# Patient Record
Sex: MALE | Race: WHITE | NOT HISPANIC OR LATINO | Employment: OTHER | URBAN - METROPOLITAN AREA
[De-identification: names, ages, dates, MRNs, and addresses within clinical notes are randomized per-mention and may not be internally consistent; named-entity substitution may affect disease eponyms.]

---

## 2023-06-07 ENCOUNTER — HOSPITAL ENCOUNTER (OUTPATIENT)
Dept: DATA CONVERSION | Facility: HOSPITAL | Age: 63
End: 2023-06-07
Attending: INTERNAL MEDICINE | Admitting: INTERNAL MEDICINE
Payer: COMMERCIAL

## 2023-06-07 DIAGNOSIS — K22.2 ESOPHAGEAL OBSTRUCTION: ICD-10-CM

## 2023-06-07 DIAGNOSIS — R12 HEARTBURN: ICD-10-CM

## 2023-06-07 DIAGNOSIS — K21.9 GASTRO-ESOPHAGEAL REFLUX DISEASE WITHOUT ESOPHAGITIS: ICD-10-CM

## 2023-06-07 DIAGNOSIS — Z12.11 ENCOUNTER FOR SCREENING FOR MALIGNANT NEOPLASM OF COLON: ICD-10-CM

## 2023-06-07 DIAGNOSIS — J45.909 UNSPECIFIED ASTHMA, UNCOMPLICATED (HHS-HCC): ICD-10-CM

## 2023-06-07 DIAGNOSIS — K29.50 UNSPECIFIED CHRONIC GASTRITIS WITHOUT BLEEDING: ICD-10-CM

## 2023-06-07 DIAGNOSIS — E11.9 TYPE 2 DIABETES MELLITUS WITHOUT COMPLICATIONS (MULTI): ICD-10-CM

## 2023-06-07 DIAGNOSIS — I10 ESSENTIAL (PRIMARY) HYPERTENSION: ICD-10-CM

## 2023-06-07 DIAGNOSIS — Z79.84 LONG TERM (CURRENT) USE OF ORAL HYPOGLYCEMIC DRUGS: ICD-10-CM

## 2023-06-07 DIAGNOSIS — R13.10 DYSPHAGIA, UNSPECIFIED: ICD-10-CM

## 2023-06-07 DIAGNOSIS — E78.5 HYPERLIPIDEMIA, UNSPECIFIED: ICD-10-CM

## 2023-06-07 LAB — POCT GLUCOSE: 155 MG/DL (ref 74–99)

## 2023-06-12 LAB
COMPLETE PATHOLOGY REPORT: NORMAL
CONVERTED CLINICAL DIAGNOSIS-HISTORY: NORMAL
CONVERTED FINAL DIAGNOSIS: NORMAL
CONVERTED FINAL REPORT PDF LINK TO COPY AND PASTE: NORMAL
CONVERTED GROSS DESCRIPTION: NORMAL

## 2023-09-07 VITALS — WEIGHT: 213.85 LBS | HEIGHT: 70 IN | BODY MASS INDEX: 30.61 KG/M2

## 2023-09-17 PROBLEM — R35.0 URINARY FREQUENCY: Status: ACTIVE | Noted: 2023-09-17

## 2023-09-17 PROBLEM — J45.909 ASTHMA (HHS-HCC): Status: ACTIVE | Noted: 2023-09-17

## 2023-09-17 PROBLEM — I10 BENIGN ESSENTIAL HYPERTENSION: Status: ACTIVE | Noted: 2023-09-17

## 2023-09-17 PROBLEM — E78.00 HYPERCHOLESTEROLEMIA: Status: ACTIVE | Noted: 2023-09-17

## 2023-09-17 PROBLEM — E66.3 OVERWEIGHT WITH BODY MASS INDEX (BMI) OF 29 TO 29.9 IN ADULT: Status: ACTIVE | Noted: 2023-09-17

## 2023-09-17 PROBLEM — K21.9 GERD (GASTROESOPHAGEAL REFLUX DISEASE): Status: ACTIVE | Noted: 2023-09-17

## 2023-09-17 PROBLEM — N40.1 BPH WITH OBSTRUCTION/LOWER URINARY TRACT SYMPTOMS: Status: ACTIVE | Noted: 2023-09-17

## 2023-09-17 PROBLEM — G47.19 EXCESSIVE DAYTIME SLEEPINESS: Status: ACTIVE | Noted: 2023-09-17

## 2023-09-17 PROBLEM — H57.10 EYE DISCOMFORT: Status: ACTIVE | Noted: 2023-09-17

## 2023-09-17 PROBLEM — D36.9 TUBULOVILLOUS ADENOMA: Status: ACTIVE | Noted: 2023-09-17

## 2023-09-17 PROBLEM — R39.9 LOWER URINARY TRACT SYMPTOMS (LUTS): Status: ACTIVE | Noted: 2023-09-17

## 2023-09-17 PROBLEM — E11.9 DM2 (DIABETES MELLITUS, TYPE 2) (MULTI): Status: ACTIVE | Noted: 2023-09-17

## 2023-09-17 PROBLEM — N13.8 BPH WITH OBSTRUCTION/LOWER URINARY TRACT SYMPTOMS: Status: ACTIVE | Noted: 2023-09-17

## 2023-09-17 PROBLEM — T15.91XA FOREIGN BODY, EYE, RIGHT, INITIAL ENCOUNTER: Status: ACTIVE | Noted: 2023-09-17

## 2023-09-17 RX ORDER — TELMISARTAN 40 MG/1
1 TABLET ORAL DAILY
COMMUNITY
Start: 2016-12-06 | End: 2023-10-30

## 2023-09-17 RX ORDER — TOBRAMYCIN 3 MG/ML
2 SOLUTION/ DROPS OPHTHALMIC 3 TIMES DAILY
COMMUNITY
Start: 2019-11-03

## 2023-09-17 RX ORDER — MULTIVITAMIN
1 TABLET ORAL DAILY
COMMUNITY
Start: 2014-12-10

## 2023-09-17 RX ORDER — METFORMIN HYDROCHLORIDE 1000 MG/1
1 TABLET ORAL
COMMUNITY
Start: 2021-09-30 | End: 2023-10-30

## 2023-09-17 RX ORDER — METHOTREXATE 2.5 MG/1
6 TABLET ORAL
COMMUNITY
Start: 2012-10-03 | End: 2024-01-03 | Stop reason: SDUPTHER

## 2023-09-17 RX ORDER — FOLIC ACID 1 MG/1
1 TABLET ORAL DAILY
COMMUNITY
End: 2024-01-03 | Stop reason: SDUPTHER

## 2023-09-17 RX ORDER — ROSUVASTATIN CALCIUM 10 MG/1
1 TABLET, COATED ORAL DAILY
COMMUNITY
Start: 2014-04-16 | End: 2023-11-27

## 2023-09-17 RX ORDER — KETOCONAZOLE 20 MG/ML
SHAMPOO, SUSPENSION TOPICAL
COMMUNITY
Start: 2023-04-17

## 2023-09-17 RX ORDER — MINERAL OIL
1 ENEMA (ML) RECTAL DAILY PRN
COMMUNITY
Start: 2018-04-02

## 2023-09-17 RX ORDER — PANTOPRAZOLE SODIUM 40 MG/1
1 TABLET, DELAYED RELEASE ORAL DAILY
COMMUNITY
End: 2024-01-24 | Stop reason: ALTCHOICE

## 2023-09-17 RX ORDER — BLOOD SUGAR DIAGNOSTIC
STRIP MISCELLANEOUS
COMMUNITY
Start: 2021-11-30 | End: 2024-04-03 | Stop reason: ALTCHOICE

## 2023-09-17 RX ORDER — AMLODIPINE BESYLATE 5 MG/1
1 TABLET ORAL EVERY EVENING
COMMUNITY
Start: 2023-05-22 | End: 2024-04-15

## 2023-09-17 RX ORDER — LANCETS
EACH MISCELLANEOUS DAILY
COMMUNITY

## 2023-10-02 ASSESSMENT — ENCOUNTER SYMPTOMS
POLYPHAGIA: 0
HUNGER: 0
BLACKOUTS: 0
WEAKNESS: 0
POLYDIPSIA: 0
SPEECH DIFFICULTY: 0
WEIGHT LOSS: 0
CONFUSION: 0
SEIZURES: 0
HEADACHES: 0
FATIGUE: 0
TREMORS: 0
BLURRED VISION: 0
VISUAL CHANGE: 0
SWEATS: 0
DIZZINESS: 0
NERVOUS/ANXIOUS: 0

## 2023-10-03 ENCOUNTER — OFFICE VISIT (OUTPATIENT)
Dept: PRIMARY CARE | Facility: CLINIC | Age: 63
End: 2023-10-03
Payer: COMMERCIAL

## 2023-10-03 VITALS
HEIGHT: 70 IN | SYSTOLIC BLOOD PRESSURE: 130 MMHG | TEMPERATURE: 97.3 F | OXYGEN SATURATION: 98 % | WEIGHT: 208.9 LBS | DIASTOLIC BLOOD PRESSURE: 85 MMHG | HEART RATE: 73 BPM | BODY MASS INDEX: 29.91 KG/M2

## 2023-10-03 DIAGNOSIS — I10 BENIGN ESSENTIAL HYPERTENSION: Primary | ICD-10-CM

## 2023-10-03 DIAGNOSIS — E11.9 TYPE 2 DIABETES MELLITUS WITHOUT COMPLICATION, WITHOUT LONG-TERM CURRENT USE OF INSULIN (MULTI): ICD-10-CM

## 2023-10-03 DIAGNOSIS — E78.00 HYPERCHOLESTEROLEMIA: ICD-10-CM

## 2023-10-03 PROBLEM — T15.91XA FOREIGN BODY, EYE, RIGHT, INITIAL ENCOUNTER: Status: RESOLVED | Noted: 2023-09-17 | Resolved: 2023-10-03

## 2023-10-03 LAB — POC HEMOGLOBIN A1C: 7.6 % (ref 4.2–6.5)

## 2023-10-03 PROCEDURE — 1036F TOBACCO NON-USER: CPT | Performed by: INTERNAL MEDICINE

## 2023-10-03 PROCEDURE — 83036 HEMOGLOBIN GLYCOSYLATED A1C: CPT | Performed by: INTERNAL MEDICINE

## 2023-10-03 PROCEDURE — 99214 OFFICE O/P EST MOD 30 MIN: CPT | Performed by: INTERNAL MEDICINE

## 2023-10-03 PROCEDURE — 3079F DIAST BP 80-89 MM HG: CPT | Performed by: INTERNAL MEDICINE

## 2023-10-03 PROCEDURE — 90471 IMMUNIZATION ADMIN: CPT | Performed by: INTERNAL MEDICINE

## 2023-10-03 PROCEDURE — 4010F ACE/ARB THERAPY RXD/TAKEN: CPT | Performed by: INTERNAL MEDICINE

## 2023-10-03 PROCEDURE — 3075F SYST BP GE 130 - 139MM HG: CPT | Performed by: INTERNAL MEDICINE

## 2023-10-03 PROCEDURE — 90686 IIV4 VACC NO PRSV 0.5 ML IM: CPT | Performed by: INTERNAL MEDICINE

## 2023-10-03 ASSESSMENT — ENCOUNTER SYMPTOMS
FEVER: 0
FATIGUE: 0
TREMORS: 0
COUGH: 0
WEAKNESS: 0
SEIZURES: 0
SPEECH DIFFICULTY: 0
BLACKOUTS: 0
VISUAL CHANGE: 0
SWEATS: 0
HUNGER: 0
ABDOMINAL PAIN: 0
SHORTNESS OF BREATH: 0
WEIGHT LOSS: 0
MYALGIAS: 1
BLURRED VISION: 0
HEADACHES: 0
POLYDIPSIA: 0
POLYPHAGIA: 0
CONFUSION: 0
NERVOUS/ANXIOUS: 0
DIZZINESS: 0

## 2023-10-03 ASSESSMENT — PATIENT HEALTH QUESTIONNAIRE - PHQ9
SUM OF ALL RESPONSES TO PHQ9 QUESTIONS 1 AND 2: 0
2. FEELING DOWN, DEPRESSED OR HOPELESS: NOT AT ALL
1. LITTLE INTEREST OR PLEASURE IN DOING THINGS: NOT AT ALL

## 2023-10-03 ASSESSMENT — PAIN SCALES - GENERAL: PAINLEVEL: 0-NO PAIN

## 2023-10-03 NOTE — PROGRESS NOTES
Subjective   Patient ID: Sanjiv Jhaveri is a 63 y.o. male who presents for Follow-up (Diabetes follow up ).    Diabetes  He has type 2 diabetes mellitus. No MedicAlert identification noted. The initial diagnosis of diabetes was made 4 years ago. Pertinent negatives for hypoglycemia include no confusion, dizziness, headaches, hunger, mood changes, nervousness/anxiousness, pallor, seizures, sleepiness, speech difficulty, sweats or tremors. Pertinent negatives for diabetes include no blurred vision, no chest pain, no fatigue, no foot paresthesias, no foot ulcerations, no polydipsia, no polyphagia, no polyuria, no visual change, no weakness and no weight loss. Pertinent negatives for hypoglycemia complications include no blackouts, no hospitalization, no nocturnal hypoglycemia, no required assistance and no required glucagon injection. Symptoms are stable. Pertinent negatives for diabetic complications include no CVA, heart disease, impotence, nephropathy, peripheral neuropathy, PVD or retinopathy. Risk factors for coronary artery disease include dyslipidemia, family history, hypertension and obesity. Current diabetic treatment includes diet and oral agent (monotherapy). He is compliant with treatment all of the time. His weight is stable. He is following a generally healthy diet. Meal planning includes carbohydrate counting. He has not had a previous visit with a dietitian. He participates in exercise daily. He monitors blood glucose at home 1-2 x per week. Blood glucose monitoring compliance is excellent. He does not see a podiatrist.Eye exam is current.       Since last visit, was sick around [facility].  Had GI virus, then developed trouble swallowing.  EGD showed Schatzi's ring and severe irritation in esophagus.    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  Sometimes gets some cramping in lower legs and feet at night.  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    Tried a carnivore diet for about  "10 days - states his blood sugar dropped to 80s.    Has not been intermittent fasting as much due to his job - the activity triggers more hunger.      Review of Systems   Constitutional:  Negative for fatigue, fever and weight loss.   Eyes:  Negative for blurred vision.   Respiratory:  Negative for cough and shortness of breath.    Cardiovascular:  Negative for chest pain.   Gastrointestinal:  Negative for abdominal pain.   Endocrine: Negative for polydipsia, polyphagia and polyuria.   Genitourinary:  Negative for impotence.   Musculoskeletal:  Positive for myalgias.   Skin:  Negative for pallor and rash.   Neurological:  Negative for dizziness, tremors, seizures, speech difficulty, weakness and headaches.   Psychiatric/Behavioral:  Negative for confusion. The patient is not nervous/anxious.        Objective   /85   Pulse 73   Temp 36.3 °C (97.3 °F) (Temporal)   Ht 1.778 m (5' 10\")   Wt 94.8 kg (208 lb 14.4 oz)   SpO2 98%   BMI 29.97 kg/m²     Physical Exam  Constitutional:       Appearance: Normal appearance.   Cardiovascular:      Rate and Rhythm: Normal rate and regular rhythm.   Pulmonary:      Effort: Pulmonary effort is normal. No respiratory distress.      Breath sounds: Normal breath sounds.   Abdominal:      General: Abdomen is flat. There is no distension.      Palpations: Abdomen is soft.   Musculoskeletal:         General: No swelling.   Skin:     Findings: No rash.   Neurological:      General: No focal deficit present.      Mental Status: He is alert and oriented to person, place, and time. Mental status is at baseline.   Psychiatric:         Mood and Affect: Mood normal.         Behavior: Behavior normal.         Thought Content: Thought content normal.         Judgment: Judgment normal.         Assessment/Plan   Problem List Items Addressed This Visit             ICD-10-CM    Benign essential hypertension - Primary I10    DM2 (diabetes mellitus, type 2) (CMS/[facility]) E11.9    Relevant Orders "    POCT glycosylated hemoglobin (Hb A1C) manually resulted    Hypercholesterolemia E78.00     DM2 - well controlled per home monitoring.  OK to follow lower carb diet, but should try to reduce saturated fat.  Continue with regular physical activity.  Check A1c.  Continue current medication.  He's up to date with regular eye exams.    Hypertension - at goal, continue current medication.    Hypercholesterolemia - controlled, values from March 2023 looked good.    Leg cramps / foot cramps - stay well hydrated, wear support shoes, can massage legs and feet in the evening and/or use a muscle roller to loosen up tissues.  For acute cramps (ie. [place]), can try yellow mustard or pickle juice.    Flu shot today.    Follow-up in 6 months and PRN.

## 2023-10-19 ENCOUNTER — OFFICE VISIT (OUTPATIENT)
Dept: UROLOGY | Facility: HOSPITAL | Age: 63
End: 2023-10-19
Payer: COMMERCIAL

## 2023-10-19 DIAGNOSIS — R39.9 LOWER URINARY TRACT SYMPTOMS (LUTS): Primary | ICD-10-CM

## 2023-10-19 PROCEDURE — 1036F TOBACCO NON-USER: CPT | Performed by: UROLOGY

## 2023-10-19 PROCEDURE — 99213 OFFICE O/P EST LOW 20 MIN: CPT | Performed by: UROLOGY

## 2023-10-19 PROCEDURE — 4010F ACE/ARB THERAPY RXD/TAKEN: CPT | Performed by: UROLOGY

## 2023-10-19 ASSESSMENT — PAIN SCALES - GENERAL: PAINLEVEL: 0-NO PAIN

## 2023-10-19 NOTE — PROGRESS NOTES
FUV    Last seen - 10/11/23     HISTORY OF PRESENT ILLNESS:   Sanjiv Jhaveri is a 63 y.o. male who is being seen today for FUV.  Pt with h/o BPH/LUTS s/p Rezum on 12/8/21 - 7 treatments.  Has been doing well.  Urinary symptoms much improved, rare urgency.  No dysuria or hematuria.  Reports his PSA was checked by PCP, told it was in normal range.      PAST MEDICAL HISTORY:  Past Medical History:   Diagnosis Date    Allergic contact dermatitis due to plants, except food 06/19/2017    Poison ivy dermatitis    Encounter for immunization 11/14/2016    Need for prophylactic vaccination and inoculation against influenza    Encounter for other preprocedural examination 11/30/2021    Pre-op exam    Other specified postprocedural states     History of left heart catheterization    Personal history of colonic polyps     History of colonic polyps    Personal history of other diseases of the respiratory system 11/03/2015    History of sinusitis    Personal history of other malignant neoplasm of skin     History of malignant neoplasm of skin    Radial styloid tenosynovitis (de quervain) 12/14/2016    De Quervain's tenosynovitis, left       PAST SURGICAL HISTORY:  Past Surgical History:   Procedure Laterality Date    COLONOSCOPY  11/03/2015    Complete Colonoscopy    OTHER SURGICAL HISTORY  06/10/2020    Complete colonoscopy    OTHER SURGICAL HISTORY  11/24/2015    Biopsy Skin    OTHER SURGICAL HISTORY  11/24/2015    Lipectomy    OTHER SURGICAL HISTORY  04/17/2017    Treatment Of The Left Wrist    SINUS SURGERY  11/24/2015    Sinus Surgery        ALLERGIES:   No Known Allergies     MEDICATIONS:   Current Outpatient Medications   Medication Instructions    amLODIPine (Norvasc) 5 mg tablet 1 tablet, oral, Every evening    fexofenadine (Allegra) 180 mg tablet 1 tablet, oral, Daily PRN    folic acid (FOLVITE) 1 mg, oral, Daily    ketoconazole (NIZOral) 2 % shampoo wash scalp and leave on for 15 MINUTES then wash off EVERY DAY to  THREE TIMES WEEKLY    lancets (OneTouch UltraSoft Lancets) misc Daily, CHECK FASTING BLOOD GLUCOSE    metFORMIN (Glucophage) 1,000 mg tablet 1 tablet, oral, 2 times daily with meals    methotrexate (Trexall) 2.5 mg tablet 6 tablets, oral, Weekly, WITH FOOD    multivitamin (multivitamin with folic acid) tablet 1 tablet, oral, Daily    OneTouch Ultra Test strip  Check fasting blood sugar once daily. Record in log book.    pantoprazole (ProtoNix) 40 mg EC tablet 1 tablet, oral, Daily, Do not crush, chew, or split.    rosuvastatin (Crestor) 10 mg tablet 1 tablet, oral, Daily    telmisartan (MIcarDIS) 40 mg tablet 1 tablet, oral, Daily    tobramycin (Tobrex) 0.3 % ophthalmic solution 2 drops, ophthalmic (eye), 3 times daily, to the affected eye        PHYSICAL EXAM:  There were no vitals taken for this visit.  Constitutional: Patient appears well-developed and well-nourished. No distress.    Pulmonary/Chest: Effort normal. No respiratory distress.   Musculoskeletal: Normal range of motion.    Neurological: Alert and oriented to person, place, and time.  Psychiatric: Normal mood and affect. Behavior is normal. Thought content normal.      Labs  Lab Results   Component Value Date    CHOL 144 02/23/2022     Lab Results   Component Value Date    HDL 54.7 02/23/2022     Lab Results   Component Value Date    CHHDL 2.6 02/23/2022     Lab Results   Component Value Date    LDLF 72 02/23/2022     Lab Results   Component Value Date    VLDL 17 02/23/2022     Lab Results   Component Value Date    TRIG 86 02/23/2022     Lab Results   Component Value Date    HGBA1C 7.6 (A) 10/03/2023           Assessment:      1. Lower urinary tract symptoms (LUTS)            Sanjiv Jhaveri is a 63 y.o. male here for FUV. Doing well.  No concerns     Plan:   1)  Continue fu with PCP, come back and see me with any issues or concerns

## 2023-10-30 DIAGNOSIS — I10 BENIGN ESSENTIAL HYPERTENSION: Primary | ICD-10-CM

## 2023-10-30 DIAGNOSIS — E11.9 TYPE 2 DIABETES MELLITUS WITHOUT COMPLICATION, WITHOUT LONG-TERM CURRENT USE OF INSULIN (MULTI): ICD-10-CM

## 2023-10-30 RX ORDER — METFORMIN HYDROCHLORIDE 1000 MG/1
1000 TABLET ORAL
Qty: 180 TABLET | Refills: 3 | Status: SHIPPED | OUTPATIENT
Start: 2023-10-30

## 2023-10-30 RX ORDER — TELMISARTAN 40 MG/1
40 TABLET ORAL DAILY
Qty: 90 TABLET | Refills: 3 | Status: SHIPPED | OUTPATIENT
Start: 2023-10-30

## 2023-11-27 DIAGNOSIS — E78.00 HYPERCHOLESTEROLEMIA: Primary | ICD-10-CM

## 2023-11-27 RX ORDER — ROSUVASTATIN CALCIUM 10 MG/1
10 TABLET, COATED ORAL DAILY
Qty: 90 TABLET | Refills: 3 | Status: SHIPPED | OUTPATIENT
Start: 2023-11-27

## 2024-01-03 DIAGNOSIS — M35.9 UNDIFFERENTIATED CONNECTIVE TISSUE DISEASE (MULTI): ICD-10-CM

## 2024-01-03 RX ORDER — FOLIC ACID 1 MG/1
1 TABLET ORAL DAILY
Qty: 30 TABLET | Refills: 8 | Status: SHIPPED | OUTPATIENT
Start: 2024-01-03

## 2024-01-03 RX ORDER — METHOTREXATE 2.5 MG/1
15 TABLET ORAL
Qty: 24 TABLET | Refills: 4 | Status: SHIPPED | OUTPATIENT
Start: 2024-01-03 | End: 2024-04-03 | Stop reason: ALTCHOICE

## 2024-01-19 ENCOUNTER — TELEPHONE (OUTPATIENT)
Dept: PRIMARY CARE | Facility: CLINIC | Age: 64
End: 2024-01-19
Payer: COMMERCIAL

## 2024-01-19 DIAGNOSIS — E11.9 TYPE 2 DIABETES MELLITUS WITHOUT COMPLICATION, WITHOUT LONG-TERM CURRENT USE OF INSULIN (MULTI): Primary | ICD-10-CM

## 2024-01-19 RX ORDER — BLOOD-GLUCOSE METER
1 EACH MISCELLANEOUS DAILY
Qty: 100 STRIP | Refills: 3 | Status: SHIPPED | OUTPATIENT
Start: 2024-01-19

## 2024-01-19 RX ORDER — LANCETS 33 GAUGE
EACH MISCELLANEOUS
Qty: 100 EACH | Refills: 3 | Status: SHIPPED | OUTPATIENT
Start: 2024-01-19

## 2024-01-19 NOTE — TELEPHONE ENCOUNTER
Rx line 1050    Lancets and test strips  1 touch vario flex -delicate plus    Pt stated he has 1 wk left.    [facility]    [phone]

## 2024-01-23 LAB
NON-UH HIE A/G RATIO: 1.6
NON-UH HIE ALB: 4.2 G/DL (ref 3.4–5)
NON-UH HIE ALK PHOS: 96 UNIT/L (ref 45–117)
NON-UH HIE BASO COUNT: 0.02 X1000 (ref 0–0.2)
NON-UH HIE BASOS %: 0.4 %
NON-UH HIE BILIRUBIN, TOTAL: 2.4 MG/DL (ref 0.3–1.2)
NON-UH HIE BUN/CREAT RATIO: 15
NON-UH HIE BUN: 15 MG/DL (ref 9–23)
NON-UH HIE C-REACTIVE PROTEIN, QUANTITATIVE: <0.4 MG/DL (ref 0–0.9)
NON-UH HIE CALCIUM: 9.7 MG/DL (ref 8.7–10.4)
NON-UH HIE CALCULATED OSMOLALITY: 283 MOSM/KG (ref 275–295)
NON-UH HIE CHLORIDE: 105 MMOL/L (ref 98–107)
NON-UH HIE CO2, VENOUS: 26 MMOL/L (ref 20–31)
NON-UH HIE CREATININE: 1 MG/DL (ref 0.6–1.1)
NON-UH HIE DIFF?: NO
NON-UH HIE EOS COUNT: 0.14 X1000 (ref 0–0.5)
NON-UH HIE EOSIN %: 2.6 %
NON-UH HIE GFR AA: >60
NON-UH HIE GLOBULIN: 2.7 G/DL
NON-UH HIE GLOMERULAR FILTRATION RATE: >60 ML/MIN/1.73M?
NON-UH HIE GLUCOSE: 142 MG/DL (ref 74–106)
NON-UH HIE GOT: 26 UNIT/L (ref 15–37)
NON-UH HIE GPT: 26 UNIT/L (ref 10–49)
NON-UH HIE HCT: 42.4 % (ref 41–52)
NON-UH HIE HGB: 14.1 G/DL (ref 13.5–17.5)
NON-UH HIE INSTR WBC: 5.6
NON-UH HIE K: 4.7 MMOL/L (ref 3.5–5.1)
NON-UH HIE LYMPH %: 33.9 %
NON-UH HIE LYMPH COUNT: 1.9 X1000 (ref 1.2–4.8)
NON-UH HIE MCH: 29.6 PG (ref 27–34)
NON-UH HIE MCHC: 33.3 G/DL (ref 32–37)
NON-UH HIE MCV: 89 FL (ref 80–100)
NON-UH HIE MONO %: 9.4 %
NON-UH HIE MONO COUNT: 0.53 X1000 (ref 0.1–1)
NON-UH HIE MPV: 8.4 FL (ref 7.4–10.4)
NON-UH HIE NA: 140 MMOL/L (ref 135–145)
NON-UH HIE NEUTROPHIL %: 53.7 %
NON-UH HIE NEUTROPHIL COUNT (ANC): 3.01 X1000 (ref 1.4–8.8)
NON-UH HIE NUCLEATED RBC: 0 /100WBC
NON-UH HIE PLATELET: 222 X10 (ref 150–450)
NON-UH HIE RBC: 4.77 X10 (ref 4.7–6.1)
NON-UH HIE RDW: 14.1 % (ref 11.5–14.5)
NON-UH HIE TOTAL PROTEIN: 6.9 G/DL (ref 5.7–8.2)
NON-UH HIE WBC: 5.6 X10 (ref 4.5–11)

## 2024-01-24 ENCOUNTER — OFFICE VISIT (OUTPATIENT)
Dept: RHEUMATOLOGY | Facility: CLINIC | Age: 64
End: 2024-01-24
Payer: COMMERCIAL

## 2024-01-24 VITALS
BODY MASS INDEX: 29.49 KG/M2 | TEMPERATURE: 97.3 F | DIASTOLIC BLOOD PRESSURE: 98 MMHG | SYSTOLIC BLOOD PRESSURE: 147 MMHG | HEART RATE: 106 BPM | WEIGHT: 206 LBS | HEIGHT: 70 IN

## 2024-01-24 DIAGNOSIS — E80.6 HYPERBILIRUBINEMIA: Primary | ICD-10-CM

## 2024-01-24 LAB
Lab: 0.8 MG/DL (ref 0.2–0.8)
NON-UH HIE BILIRUBIN, DIRECT: 0.43 MG/DL (ref 0–0.4)
NON-UH HIE BILIRUBIN, TOTAL: 1.2 MG/DL (ref 0.3–1.2)

## 2024-01-24 PROCEDURE — 99214 OFFICE O/P EST MOD 30 MIN: CPT | Performed by: INTERNAL MEDICINE

## 2024-01-24 PROCEDURE — 3077F SYST BP >= 140 MM HG: CPT | Performed by: INTERNAL MEDICINE

## 2024-01-24 PROCEDURE — 4010F ACE/ARB THERAPY RXD/TAKEN: CPT | Performed by: INTERNAL MEDICINE

## 2024-01-24 PROCEDURE — 3080F DIAST BP >= 90 MM HG: CPT | Performed by: INTERNAL MEDICINE

## 2024-01-24 PROCEDURE — 1036F TOBACCO NON-USER: CPT | Performed by: INTERNAL MEDICINE

## 2024-01-24 ASSESSMENT — PAIN SCALES - GENERAL: PAINLEVEL: 0-NO PAIN

## 2024-01-24 NOTE — PROGRESS NOTES
He is currently doing exercises for bilateral frozen shoulder.  The right frozen shoulder has significantly improved over the past 1 year.  He recently developed left frozen shoulder after immobilization following surgery for trigger finger release on the second and fifth digits of the left hand.  He has not noted any nausea, abdominal pain, or any significant joint swelling.  He underwent EGD with dilation of lower esophageal Schatzki's rings.    Examination shows the lungs, heart, abdomen, and extremities to be benign the musculoskeletal examination shows rotation to the abduction of shoulders (90 degrees with pain abduction of the left shoulder.  There is Dupuytren contracture left hand more so than the right.  There is no jaundice.    Laboratory (1/23/2024) glucose 142, BUN 15, creatinine 1.0, total bilirubin 2.40, alkaline phosphatase 96, SGOT 26, GPT 26, WBC 5.6, hemoglobin 14.1, hematocrit 42.4, MCV 89, MCHC 33.3, platelets 222, CRP <0.4.    Has history of idiopathic iritis that has been is asymptomatic.  He has osteoarthritis, type 2 diabetes mellitus, hypertension, BPH, asthma, distal esophageal strictures, elevated total bilirubin.    He is continue methotrexate 15 ultrasound of the left total and direct bilirubin consider gastroenterology evaluation.  He is to return at the next available office evaluation.

## 2024-01-26 LAB — NON-UH HIE ANTI-MITOCHONDRIAL ANTIBODIES: 1.8 (ref 0–24.9)

## 2024-01-27 LAB — NON-UH HIE SMOOTH MUSCLE AB, IGG TITER: NORMAL

## 2024-01-30 ENCOUNTER — TELEPHONE (OUTPATIENT)
Dept: RHEUMATOLOGY | Facility: CLINIC | Age: 64
End: 2024-01-30

## 2024-02-05 DIAGNOSIS — K76.0 FATTY LIVER: Primary | ICD-10-CM

## 2024-02-05 LAB
NON-UH HIE HEPATITIS B SURFACE ANTIBODY: REACTIVE
NON-UH HIE HEPATITIS B SURFACE ANTIGEN: NONREACTIVE
NON-UH HIE HEPATITIS C ANTIBODY: NONREACTIVE

## 2024-02-06 DIAGNOSIS — H20.9 UVEITIS: Primary | ICD-10-CM

## 2024-02-06 LAB
NON-UH HIE ANA PATTERN: NORMAL
NON-UH HIE ANTI-NUCLEAR AB QUANT: NORMAL
NON-UH HIE ANTI-NUCLEAR ANTIBODY: POSITIVE

## 2024-02-06 RX ORDER — ADALIMUMAB 40MG/0.4ML
40 KIT SUBCUTANEOUS
Qty: 2 EACH | Refills: 5 | Status: SHIPPED | OUTPATIENT
Start: 2024-02-06 | End: 2024-02-13 | Stop reason: SDUPTHER

## 2024-02-06 NOTE — PROGRESS NOTES
Per Dr. 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  He is currently taking methotrexate with liver ultrasound demonstrating fatty liver and positive anti-smooth muscle antibody.

## 2024-02-07 ENCOUNTER — SPECIALTY PHARMACY (OUTPATIENT)
Dept: PHARMACY | Facility: CLINIC | Age: 64
End: 2024-02-07

## 2024-02-07 LAB — NON-UH HIE HEPATITIS B CORE ANTIBODIES, TOTAL: NEGATIVE

## 2024-02-08 LAB
NON-UH HIE QUANTIFERON MITOGEN MINUS NIL: 9 IU/ML
NON-UH HIE QUANTIFERON NIL: 0.14 IU/ML
NON-UH HIE QUANTIFERON PLUS TB1 MINUS NIL: 0.01 IU/ML (ref 0–0.34)
NON-UH HIE QUANTIFERON PLUS TB2 MINUS NIL: 0 IU/ML (ref 0–0.34)
NON-UH HIE QUANTIFERON TB GOLD PLUS: NEGATIVE

## 2024-02-09 LAB
NON-UH HIE SMITH (ENA) ANTIBODY, IGG: 1 (ref 0–40)
NON-UH HIE SMITH/RNP (ENA) AB, IGG: 2 (ref 0–19)
NON-UH HIE SSA 52 (RO) (ENA) AB, IGG: 5 (ref 0–40)
NON-UH HIE SSA 60 (RO) (ENA) AB, IGG: 1 (ref 0–40)
NON-UH HIE SSB (LA) (ENA) AB, IGG: 0 (ref 0–40)

## 2024-02-11 ENCOUNTER — TELEPHONE (OUTPATIENT)
Dept: PHARMACY | Facility: CLINIC | Age: 64
End: 2024-02-11

## 2024-02-13 DIAGNOSIS — H20.9 UVEITIS: ICD-10-CM

## 2024-02-13 RX ORDER — ADALIMUMAB 40MG/0.4ML
40 KIT SUBCUTANEOUS
Qty: 2 EACH | Refills: 5 | Status: SHIPPED | OUTPATIENT
Start: 2024-02-13 | End: 2024-03-20 | Stop reason: CLARIF

## 2024-02-22 ENCOUNTER — APPOINTMENT (OUTPATIENT)
Dept: UROLOGY | Facility: HOSPITAL | Age: 64
End: 2024-02-22
Payer: COMMERCIAL

## 2024-03-05 ENCOUNTER — TELEPHONE (OUTPATIENT)
Dept: RHEUMATOLOGY | Facility: CLINIC | Age: 64
End: 2024-03-05
Payer: COMMERCIAL

## 2024-03-05 NOTE — TELEPHONE ENCOUNTER
Pt call returned regarding Humira being lost pharmacies. Unable to establish direct contact with pt and vm left. Pt made aware that script sent to [facility] and will be coming from there. Phone number left for pharmacy on vm as reference if needed. Encouraged to call/ message office should anything further be needed.

## 2024-03-05 NOTE — TELEPHONE ENCOUNTER
Patient's Humira got lost in getting refilled between [facility] and [facility]. , Patient needs a refill, please advise.

## 2024-03-12 ENCOUNTER — TELEPHONE (OUTPATIENT)
Dept: RHEUMATOLOGY | Facility: CLINIC | Age: 64
End: 2024-03-12
Payer: COMMERCIAL

## 2024-03-12 DIAGNOSIS — H20.00: Primary | ICD-10-CM

## 2024-03-12 NOTE — TELEPHONE ENCOUNTER
Call placed to pt pharmacy [facility] to clarify request for verbal order when one was previously sent with refills in February with refills. Per rep, Ilda, pt does not have an account created. Ilda states that pt is using another pharmacy. This nurse to consult with pharmacy. Nothing further needed.

## 2024-03-12 NOTE — TELEPHONE ENCOUNTER
[facility] on Humira. Please call [phone].

## 2024-03-20 RX ORDER — ADALIMUMAB-ADAZ 40 MG/.4ML
40 INJECTION, SOLUTION SUBCUTANEOUS
Qty: 0.8 ML | Refills: 5 | Status: SHIPPED | OUTPATIENT
Start: 2024-03-20

## 2024-03-20 NOTE — TELEPHONE ENCOUNTER
Per insurance, pt must switch from Humira to Hyrimoz. PA approved through Aug 2024. Pt must fill with [facility]. Updated Hyrimoz rx rerouted to [facility].

## 2024-03-27 ASSESSMENT — ENCOUNTER SYMPTOMS
VISUAL CHANGE: 0
TREMORS: 0
SWEATS: 0
SPEECH DIFFICULTY: 0
WEIGHT LOSS: 0
WEAKNESS: 0
POLYDIPSIA: 0
HEADACHES: 0
POLYPHAGIA: 0
SEIZURES: 0
BLACKOUTS: 0
CONFUSION: 0
BLURRED VISION: 0
FATIGUE: 1
DIZZINESS: 0
NERVOUS/ANXIOUS: 0
HUNGER: 0

## 2024-04-03 ENCOUNTER — OFFICE VISIT (OUTPATIENT)
Dept: PRIMARY CARE | Facility: CLINIC | Age: 64
End: 2024-04-03
Payer: COMMERCIAL

## 2024-04-03 VITALS
TEMPERATURE: 97.9 F | OXYGEN SATURATION: 98 % | WEIGHT: 202 LBS | HEIGHT: 70 IN | DIASTOLIC BLOOD PRESSURE: 91 MMHG | SYSTOLIC BLOOD PRESSURE: 147 MMHG | HEART RATE: 81 BPM | BODY MASS INDEX: 28.92 KG/M2

## 2024-04-03 DIAGNOSIS — E11.9 TYPE 2 DIABETES MELLITUS WITHOUT COMPLICATION, WITHOUT LONG-TERM CURRENT USE OF INSULIN (MULTI): Primary | ICD-10-CM

## 2024-04-03 DIAGNOSIS — E11.65 TYPE 2 DIABETES MELLITUS WITH HYPERGLYCEMIA, WITHOUT LONG-TERM CURRENT USE OF INSULIN (MULTI): ICD-10-CM

## 2024-04-03 DIAGNOSIS — I10 BENIGN ESSENTIAL HYPERTENSION: ICD-10-CM

## 2024-04-03 LAB — POC HEMOGLOBIN A1C: 7.5 % (ref 4.2–6.5)

## 2024-04-03 PROCEDURE — 99213 OFFICE O/P EST LOW 20 MIN: CPT | Performed by: INTERNAL MEDICINE

## 2024-04-03 PROCEDURE — 3080F DIAST BP >= 90 MM HG: CPT | Performed by: INTERNAL MEDICINE

## 2024-04-03 PROCEDURE — 83036 HEMOGLOBIN GLYCOSYLATED A1C: CPT | Performed by: INTERNAL MEDICINE

## 2024-04-03 PROCEDURE — 1036F TOBACCO NON-USER: CPT | Performed by: INTERNAL MEDICINE

## 2024-04-03 PROCEDURE — 3077F SYST BP >= 140 MM HG: CPT | Performed by: INTERNAL MEDICINE

## 2024-04-03 PROCEDURE — 4010F ACE/ARB THERAPY RXD/TAKEN: CPT | Performed by: INTERNAL MEDICINE

## 2024-04-03 ASSESSMENT — PAIN SCALES - GENERAL: PAINLEVEL: 5

## 2024-04-03 ASSESSMENT — ENCOUNTER SYMPTOMS
BLURRED VISION: 0
HUNGER: 0
VISUAL CHANGE: 0
BLACKOUTS: 0
SEIZURES: 0
HEADACHES: 0
DIZZINESS: 0
POLYDIPSIA: 0
DEPRESSION: 0
LOSS OF SENSATION IN FEET: 0
WEIGHT LOSS: 0
FATIGUE: 1
OCCASIONAL FEELINGS OF UNSTEADINESS: 0
WEAKNESS: 0
CONFUSION: 0
NERVOUS/ANXIOUS: 0
TREMORS: 0
POLYPHAGIA: 0
SPEECH DIFFICULTY: 0
SWEATS: 0

## 2024-04-03 ASSESSMENT — PATIENT HEALTH QUESTIONNAIRE - PHQ9
1. LITTLE INTEREST OR PLEASURE IN DOING THINGS: NOT AT ALL
SUM OF ALL RESPONSES TO PHQ9 QUESTIONS 1 AND 2: 0
2. FEELING DOWN, DEPRESSED OR HOPELESS: NOT AT ALL

## 2024-04-03 NOTE — PROGRESS NOTES
CHIEF COMPLAINT  Follow-up (Left shoulder pain, H1ac, 6 month follow up )    HISTORY OF PRESENT ILLNESS  Sanjiv Jhaveri is a 63 y.o. male presents today for follow up of Follow-up (Left shoulder pain, H1ac, 6 month follow up )    Diabetes  He has type 2 diabetes mellitus. No MedicAlert identification noted. The initial diagnosis of diabetes was made 6 years ago. Pertinent negatives for hypoglycemia include no confusion, dizziness, headaches, hunger, mood changes, nervousness/anxiousness, pallor, seizures, sleepiness, speech difficulty, sweats or tremors. Associated symptoms include fatigue. Pertinent negatives for diabetes include no blurred vision, no chest pain, no foot paresthesias, no foot ulcerations, no polydipsia, no polyphagia, no polyuria, no visual change, no weakness and no weight loss. Pertinent negatives for hypoglycemia complications include no blackouts, no hospitalization, no nocturnal hypoglycemia, no required assistance and no required glucagon injection. Pertinent negatives for diabetic complications include no CVA, heart disease, impotence, nephropathy, peripheral neuropathy, PVD or retinopathy. Risk factors for coronary artery disease include family history, hypertension and obesity. Current diabetic treatment includes diet and oral agent (monotherapy). He is compliant with treatment most of the time. His weight is stable. He is following a generally healthy diet. Meal planning includes avoidance of concentrated sweets and carbohydrate counting. He has not had a previous visit with a dietitian. He participates in exercise daily. He monitors blood glucose at home 3-4 x per week. Blood glucose monitoring compliance is good. His home blood glucose trend is fluctuating minimally. He does not see a podiatrist.Eye exam is current.       Had surgery of left hand in the fall.  Was complicated by infection, had not really used his left arm much due to the complications.  He states that he thinks he  developed frozen shoulder, as he had had that in the past and it felt similar.  He started exercises on his own, then did a virtual exercise program through his insurance.  At first it was primarily reduced ROM, now has a lot of pain with hit, hurts all the time and at night.   He gets his [facility]opedic care [facility].    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  Has been controlled with methotrexate.  Recently bilirubin increased.  Had liver ultrasound suggesting possible fibrosis.  He is in the process of changing from methotrexate to a biosimilar agent.  Has follow-up scheduled with GI.    Blood sugar has been elevated lately, but still usually < 200.  He's very careful with diet and practices intermittent fasting.    REVIEW OF SYSTEMS  Review of Systems   Constitutional:  Positive for fatigue. Negative for weight loss.   Eyes:  Negative for blurred vision.   Cardiovascular:  Negative for chest pain.   Endocrine: Negative for polydipsia, polyphagia and polyuria.   Genitourinary:  Negative for impotence.   Skin:  Negative for pallor.   Neurological:  Negative for dizziness, tremors, seizures, speech difficulty, weakness and headaches.   Psychiatric/Behavioral:  Negative for confusion. The patient is not nervous/anxious.        ALLERGIES  Patient has no known allergies.    MEDICATIONS  Current Outpatient Medications   Medication Instructions    adalimumab-adaz 40 mg, subcutaneous, Every 14 days    amLODIPine (Norvasc) 5 mg tablet 1 tablet, oral, Every evening    blood sugar diagnostic (OneTouch Verio test strips) strip 1 strip, miscellaneous, Daily    fexofenadine (Allegra) 180 mg tablet 1 tablet, oral, Daily PRN    folic acid (FOLVITE) 1 mg, oral, Daily    ketoconazole (NIZOral) 2 % shampoo wash scalp and leave on for 15 MINUTES then wash off EVERY DAY to THREE TIMES WEEKLY    lancets (OneTouch Delica Plus Lancet) 33 gauge misc Use to check blood sugar once daily.    lancets (OneTouch UltraSoft Lancets)  "misc Daily, CHECK FASTING BLOOD GLUCOSE    metFORMIN (GLUCOPHAGE) 1,000 mg, oral, 2 times daily with meals    methotrexate (TREXALL) 15 mg, oral, Weekly, WITH FOOD    multivitamin (multivitamin with folic acid) tablet 1 tablet, oral, Daily    OneTouch Ultra Test strip  Check fasting blood sugar once daily. Record in log book.    rosuvastatin (CRESTOR) 10 mg, oral, Daily    telmisartan (MICARDIS) 40 mg, oral, Daily    tobramycin (Tobrex) 0.3 % ophthalmic solution 2 drops, ophthalmic (eye), 3 times daily, to the affected eye       TOBACCO USE  Social History     Tobacco Use   Smoking Status Never   Smokeless Tobacco Never       DEPRESSION SCREEN  Over the past 2 weeks, how often have you been bothered by any of the following problems?  Little interest or pleasure in doing things: Not at all  Feeling down, depressed, or hopeless: Not at all    SURGICAL HISTORY  Past Surgical History:  11/03/2015: COLONOSCOPY      Comment:  Complete Colonoscopy  10/26/2023: FINGER SURGERY      Comment:  left index finger tenosynovectomy.  Dr. Patel.  06/10/2020: OTHER SURGICAL HISTORY      Comment:  Complete colonoscopy  11/24/2015: OTHER SURGICAL HISTORY      Comment:  Biopsy Skin  11/24/2015: OTHER SURGICAL HISTORY      Comment:  Lipectomy  04/17/2017: OTHER SURGICAL HISTORY      Comment:  Treatment Of The Left Wrist  11/24/2015: SINUS SURGERY      Comment:  Sinus Surgery       OBJECTIVE    BP (!) 147/91   Pulse 81   Temp 36.6 °C (97.9 °F) (Temporal)   Ht 1.778 m (5' 10\")   Wt 91.6 kg (202 lb)   SpO2 98%   BMI 28.98 kg/m²    BMI: Estimated body mass index is 28.98 kg/m² as calculated from the following:    Height as of this encounter: 1.778 m (5' 10\").    Weight as of this encounter: 91.6 kg (202 lb).    BP Readings from Last 3 Encounters:   04/03/24 (!) 147/91   01/24/24 (!) 147/98   10/03/23 130/85      Wt Readings from Last 3 Encounters:   04/03/24 91.6 kg (202 lb)   01/24/24 93.4 kg (206 lb)   10/03/23 94.8 kg (208 lb 14.4 " oz)        PHYSICAL EXAM  Physical Exam  Constitutional:       Appearance: Normal appearance.   HENT:      Head: Normocephalic and atraumatic.   Cardiovascular:      Rate and Rhythm: Normal rate and regular rhythm.      Heart sounds: No murmur heard.  Pulmonary:      Effort: Pulmonary effort is normal. No respiratory distress.      Breath sounds: Normal breath sounds. No wheezing.   Musculoskeletal:      Right lower leg: No edema.      Left lower leg: No edema.   Skin:     Findings: No rash.   Neurological:      General: No focal deficit present.      Mental Status: He is alert and oriented to person, place, and time. Mental status is at baseline.   Psychiatric:         Mood and Affect: Mood normal.         Behavior: Behavior normal.         Thought Content: Thought content normal.         Judgment: Judgment normal.         ASSESSMENT AND PLAN  Assessment/Plan   Problem List Items Addressed This Visit       Benign essential hypertension    DM2 (diabetes mellitus, type 2) (CMS/[facility]) - Primary    Relevant Orders    POCT glycosylated hemoglobin (Hb A1C) manually resulted (Completed)       Hypertension - not at goal today.  He prefers not to increase his medication.  He will work on walking for exercise and continued healthy diet.  Continue current medication.    DM2 - check A1c.  He is in process of getting Dexcom meter through insurance.  He is hoping he can get this so that he can better determine which foods cause his blood sugar to spike.     Continue routine follow-up with all specialists.  I agree with orthopedist consultation for left shoulder pain - he plans to schedule through [facility] where he is already established.     Follow-up 6 months.

## 2024-04-04 ENCOUNTER — OFFICE VISIT (OUTPATIENT)
Dept: GASTROENTEROLOGY | Facility: CLINIC | Age: 64
End: 2024-04-04
Payer: COMMERCIAL

## 2024-04-04 VITALS
WEIGHT: 206 LBS | BODY MASS INDEX: 29.49 KG/M2 | SYSTOLIC BLOOD PRESSURE: 159 MMHG | HEIGHT: 70 IN | HEART RATE: 67 BPM | DIASTOLIC BLOOD PRESSURE: 91 MMHG

## 2024-04-04 DIAGNOSIS — H20.9 UVEITIS: ICD-10-CM

## 2024-04-04 DIAGNOSIS — K76.0 FATTY LIVER: ICD-10-CM

## 2024-04-04 DIAGNOSIS — R79.89 ABNORMAL LIVER FUNCTION TEST: Primary | ICD-10-CM

## 2024-04-04 DIAGNOSIS — D36.9 TUBULOVILLOUS ADENOMA: ICD-10-CM

## 2024-04-04 DIAGNOSIS — R76.8 POSITIVE ANA (ANTINUCLEAR ANTIBODY): ICD-10-CM

## 2024-04-04 PROCEDURE — 99213 OFFICE O/P EST LOW 20 MIN: CPT | Performed by: INTERNAL MEDICINE

## 2024-04-04 PROCEDURE — 1036F TOBACCO NON-USER: CPT | Performed by: INTERNAL MEDICINE

## 2024-04-04 PROCEDURE — 4010F ACE/ARB THERAPY RXD/TAKEN: CPT | Performed by: INTERNAL MEDICINE

## 2024-04-04 PROCEDURE — 3080F DIAST BP >= 90 MM HG: CPT | Performed by: INTERNAL MEDICINE

## 2024-04-04 PROCEDURE — 3077F SYST BP >= 140 MM HG: CPT | Performed by: INTERNAL MEDICINE

## 2024-04-04 ASSESSMENT — ENCOUNTER SYMPTOMS
VOMITING: 0
ARTHRALGIAS: 1
NAUSEA: 0
DIARRHEA: 0
CARDIOVASCULAR NEGATIVE: 1
CONSTITUTIONAL NEGATIVE: 1
BLOOD IN STOOL: 0
CONSTIPATION: 0
GASTROINTESTINAL NEGATIVE: 1
ABDOMINAL PAIN: 0
JOINT SWELLING: 1
ABDOMINAL DISTENTION: 0
RESPIRATORY NEGATIVE: 1
RECTAL PAIN: 0
ANAL BLEEDING: 0

## 2024-04-04 NOTE — PROGRESS NOTES
History of an elevated bilirubin as well as abnormal ultrasound he is a diabetic.  He has no prior history of chronic liver disease.  His LFTs otherwise were normal other than a mildly elevated bilirubin of 2.4 his ultrasound showed some fatty liver changes but did not show any evidence of portal hypertension or cirrhosis.  Subjective     History of Present Illness:   Sanjiv Jhaveri is a 63 y.o. male who presents to GI clinic fo elevated bilirubin in the background of abnormal ultrasound of the liver as well as history of uveitis for which she was taking methotrexate.  He is being switched over to Humira injections.    He has got longstanding diabetes.  He has also uveitis for which she was taking methotrexate.  He has no prior history of liver disease he drinks 2 beers in a week.    His other medical problems include esophageal stricture biopsies were negative for Hendrix's.  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  His last colonoscopy in June 2020 showed a tubular adenoma in the hepatic flexure which was removed.  He has no bowel symptoms at this time.  For his liver disease he was checked for anti-smooth muscle antibody which was positive in 1 and 80 dilution.  He is VEE was also strongly positive.  He also has ongoing uveitis.  He was always iron deficient and required iron therapy to give blood donations.  He is hepatitis C antibody was negative hepatitis B surface antigen was negative but they surface antibody was positive.    Personal history does not smoke cigarettes 2 beers in a week.  He is is with  [employer]  Go back to me next patient essentially working here  Family history negative for inflammatory bowel disease.  Or cancer.  .        Review of Systems  Review of Systems   Constitutional: Negative.    HENT: Negative.     Eyes:         History of uveitis for which she was taking methotrexate currently  going to be started on Humira.   Respiratory: Negative.     Cardiovascular: Negative.    Gastrointestinal: Negative.  Negative for abdominal distention, abdominal pain, anal bleeding, blood in stool, constipation, diarrhea, nausea, rectal pain and vomiting.   Musculoskeletal:  Positive for arthralgias and joint swelling.       Past Medical History   has a past medical history of Allergic contact dermatitis due to plants, except food (06/19/2017), Encounter for immunization (11/14/2016), Encounter for other preprocedural examination (11/30/2021), Other specified postprocedural states, Personal history of colonic polyps, Personal history of other diseases of the respiratory system (11/03/2015), Personal history of other malignant neoplasm of skin, and Radial styloid tenosynovitis (de quervain) (12/14/2016).     Social History   reports that he has never smoked. He has never used smokeless tobacco. He reports current alcohol use. He reports that he does not use drugs.     Family History  family history includes CABG in his father; Colon cancer in his father; Diabetes in his mother.     Allergies  No Known Allergies    Medications  Current Outpatient Medications   Medication Instructions    adalimumab-adaz 40 mg, subcutaneous, Every 14 days    amLODIPine (Norvasc) 5 mg tablet 1 tablet, oral, Every evening    blood sugar diagnostic (OneTouch Verio test strips) strip 1 strip, miscellaneous, Daily    fexofenadine (Allegra) 180 mg tablet 1 tablet, oral, Daily PRN    folic acid (FOLVITE) 1 mg, oral, Daily    ketoconazole (NIZOral) 2 % shampoo wash scalp and leave on for 15 MINUTES then wash off EVERY DAY to THREE TIMES WEEKLY    lancets (OneTouch Delica Plus Lancet) 33 gauge misc Use to check blood sugar once daily.    lancets (OneTouch UltraSoft Lancets) misc Daily, CHECK FASTING BLOOD GLUCOSE    metFORMIN (GLUCOPHAGE) 1,000 mg, oral, 2 times daily with meals    multivitamin (multivitamin with folic acid) tablet 1 tablet,  oral, Daily    rosuvastatin (CRESTOR) 10 mg, oral, Daily    telmisartan (MICARDIS) 40 mg, oral, Daily    tobramycin (Tobrex) 0.3 % ophthalmic solution 2 drops, ophthalmic (eye), 3 times daily, to the affected eye        Objective   Visit Vitals  BP (!) 159/91   Pulse 67      Physical Exam  Vitals reviewed.   Constitutional:       Appearance: Normal appearance.   HENT:      Head: Normocephalic.   Eyes:      Extraocular Movements: Extraocular movements intact.      Pupils: Pupils are equal, round, and reactive to light.   Neck:      Vascular: No carotid bruit.   Cardiovascular:      Rate and Rhythm: Normal rate and regular rhythm.      Pulses: Normal pulses.      Heart sounds: Normal heart sounds.   Pulmonary:      Effort: Pulmonary effort is normal.      Breath sounds: Normal breath sounds.   Abdominal:      General: Abdomen is flat. Bowel sounds are normal. There is no distension.      Palpations: Abdomen is soft. There is no mass.      Tenderness: There is no abdominal tenderness. There is no right CVA tenderness, left CVA tenderness, guarding or rebound.      Hernia: No hernia is present.      Comments: No evidence of hepatosplenomegaly ascites or collateral circulation.   Genitourinary:     Comments: Not examined  Musculoskeletal:      Cervical back: Normal range of motion and neck supple.   Neurological:      General: No focal deficit present.      Mental Status: He is alert.   Psychiatric:         Mood and Affect: Mood normal.           Diagnosis  No diagnosis found.  This is an addendum.  The diagnosis includes abnormal liver function test was #2 fatty liver #3 uveitis #4 history of tubular and tubulovillous adenomas.    Assessment/Plan   Sanjiv Jhaveri is a 63 y.o. male who presents to GI clinic for abnormal liver function test with a mildly elevated bilirubin of 2.4 he hepatitis C and B was negative antinuclear factor and anti-smooth muscle antibody was both elevated at 180 dilution.  He is ultrasound of  the liver showed some heterogenous parenchyma but no nodules or masses or signs of portal hypertension or cirrhosis.  This may be most likely related to fatty liver in association with his diabetes which is under good control now.  He also has chronic uveitis for which she is to be on methotrexate which has been stopped and he is going to be started shortly on biologic agents.  I do not think he requires any liver biopsy at this time.  We should repeat his liver functions in about 3 months.    He also has a history of tubular adenomas and tubulovillous adenomas in the past and his last colonoscopy in 2019 showed a tubular adenoma in the hepatic flexure he should have a follow-up colonoscopy examination at his convenience and this was discussed with the patient at length.  .          Kylah Mo MD

## 2024-04-05 ENCOUNTER — TELEPHONE (OUTPATIENT)
Dept: RHEUMATOLOGY | Facility: CLINIC | Age: 64
End: 2024-04-05
Payer: COMMERCIAL

## 2024-04-05 NOTE — TELEPHONE ENCOUNTER
Patient was informed that his Methotrexate was discontinued. Please advise to get this resolved. Thank you

## 2024-04-05 NOTE — TELEPHONE ENCOUNTER
Call placed to pt to make aware of methotrexate being discontinued by PCP for noted completion of therapy. Pt states he was concerned because he didn't think Humira biosimilar would be in and that he'd be out of everything. Pt states prior to this nurse calling, he was made aware of medication being overnighted. No other concerns to address. Pt was thankful to call placed.

## 2024-04-14 DIAGNOSIS — I10 ESSENTIAL (PRIMARY) HYPERTENSION: ICD-10-CM

## 2024-04-15 RX ORDER — AMLODIPINE BESYLATE 5 MG/1
5 TABLET ORAL EVERY EVENING
Qty: 90 TABLET | Refills: 3 | Status: SHIPPED | OUTPATIENT
Start: 2024-04-15

## 2024-05-22 ENCOUNTER — APPOINTMENT (OUTPATIENT)
Dept: GASTROENTEROLOGY | Facility: HOSPITAL | Age: 64
End: 2024-05-22
Payer: COMMERCIAL

## 2024-06-05 LAB
NON-UH HIE A/G RATIO: 1.3
NON-UH HIE ALB: 3.9 G/DL (ref 3.4–5)
NON-UH HIE ALK PHOS: 104 UNIT/L (ref 45–117)
NON-UH HIE BASO COUNT: 0.05 X1000 (ref 0–0.2)
NON-UH HIE BASOS %: 0.9 %
NON-UH HIE BILIRUBIN, TOTAL: 1.2 MG/DL (ref 0.3–1.2)
NON-UH HIE BUN/CREAT RATIO: 19.1
NON-UH HIE BUN: 21 MG/DL (ref 9–23)
NON-UH HIE C-REACTIVE PROTEIN, QUANTITATIVE: <0.4 MG/DL (ref 0–0.9)
NON-UH HIE CALCIUM: 9.4 MG/DL (ref 8.7–10.4)
NON-UH HIE CALCULATED OSMOLALITY: 291 MOSM/KG (ref 275–295)
NON-UH HIE CHLORIDE: 109 MMOL/L (ref 98–107)
NON-UH HIE CO2, VENOUS: 27 MMOL/L (ref 20–31)
NON-UH HIE CREATININE: 1.1 MG/DL (ref 0.6–1.1)
NON-UH HIE DIFF?: NO
NON-UH HIE EOS COUNT: 0.15 X1000 (ref 0–0.5)
NON-UH HIE EOSIN %: 2.6 %
NON-UH HIE GFR AA: >60
NON-UH HIE GLOBULIN: 3 G/DL
NON-UH HIE GLOMERULAR FILTRATION RATE: >60 ML/MIN/1.73M?
NON-UH HIE GLUCOSE: 185 MG/DL (ref 74–106)
NON-UH HIE GOT: 19 UNIT/L (ref 15–37)
NON-UH HIE GPT: 22 UNIT/L (ref 10–49)
NON-UH HIE HCT: 42.2 % (ref 41–52)
NON-UH HIE HGB: 14.1 G/DL (ref 13.5–17.5)
NON-UH HIE INSTR WBC: 5.7
NON-UH HIE K: 4.9 MMOL/L (ref 3.5–5.1)
NON-UH HIE LYMPH %: 30.6 %
NON-UH HIE LYMPH COUNT: 1.74 X1000 (ref 1.2–4.8)
NON-UH HIE MCH: 30 PG (ref 27–34)
NON-UH HIE MCHC: 33.5 G/DL (ref 32–37)
NON-UH HIE MCV: 89.8 FL (ref 80–100)
NON-UH HIE MONO %: 8.7 %
NON-UH HIE MONO COUNT: 0.5 X1000 (ref 0.1–1)
NON-UH HIE MPV: 8.8 FL (ref 7.4–10.4)
NON-UH HIE NA: 142 MMOL/L (ref 135–145)
NON-UH HIE NEUTROPHIL %: 57.3 %
NON-UH HIE NEUTROPHIL COUNT (ANC): 3.26 X1000 (ref 1.4–8.8)
NON-UH HIE NUCLEATED RBC: 0 /100WBC
NON-UH HIE PLATELET: 213 X10 (ref 150–450)
NON-UH HIE RBC: 4.7 X10 (ref 4.7–6.1)
NON-UH HIE RDW: 13 % (ref 11.5–14.5)
NON-UH HIE TOTAL PROTEIN: 6.9 G/DL (ref 5.7–8.2)
NON-UH HIE WBC: 5.7 X10 (ref 4.5–11)

## 2024-07-09 ENCOUNTER — TELEPHONE (OUTPATIENT)
Dept: GASTROENTEROLOGY | Facility: CLINIC | Age: 64
End: 2024-07-09
Payer: COMMERCIAL

## 2024-07-09 DIAGNOSIS — Z12.11 COLON CANCER SCREENING: Primary | ICD-10-CM

## 2024-07-09 NOTE — TELEPHONE ENCOUNTER
Patient would like to schedule a colonoscopy with Dr. STEWART does not have an order but in his notes from his last appointment 4/4/24 it was discussed that one was needed. Thank you

## 2024-07-11 RX ORDER — SODIUM, POTASSIUM,MAG SULFATES 17.5-3.13G
SOLUTION, RECONSTITUTED, ORAL ORAL
Qty: 354 ML | Refills: 0 | Status: SHIPPED | OUTPATIENT
Start: 2024-07-11

## 2024-07-17 ENCOUNTER — ANESTHESIA (OUTPATIENT)
Dept: GASTROENTEROLOGY | Facility: HOSPITAL | Age: 64
End: 2024-07-17
Payer: COMMERCIAL

## 2024-07-17 ENCOUNTER — ANESTHESIA EVENT (OUTPATIENT)
Dept: GASTROENTEROLOGY | Facility: HOSPITAL | Age: 64
End: 2024-07-17
Payer: COMMERCIAL

## 2024-07-17 ENCOUNTER — HOSPITAL ENCOUNTER (OUTPATIENT)
Dept: GASTROENTEROLOGY | Facility: HOSPITAL | Age: 64
Setting detail: OUTPATIENT SURGERY
Discharge: HOME | End: 2024-07-17
Payer: COMMERCIAL

## 2024-07-17 VITALS
DIASTOLIC BLOOD PRESSURE: 74 MMHG | RESPIRATION RATE: 18 BRPM | SYSTOLIC BLOOD PRESSURE: 126 MMHG | HEIGHT: 70 IN | WEIGHT: 205.91 LBS | BODY MASS INDEX: 29.48 KG/M2 | HEART RATE: 72 BPM | OXYGEN SATURATION: 98 % | TEMPERATURE: 96.8 F

## 2024-07-17 DIAGNOSIS — Z12.11 ENCOUNTER FOR SCREENING FOR MALIGNANT NEOPLASM OF COLON: ICD-10-CM

## 2024-07-17 DIAGNOSIS — D12.6 TUBULOVILLOUS ADENOMA OF COLON: Primary | ICD-10-CM

## 2024-07-17 LAB — GLUCOSE BLD MANUAL STRIP-MCNC: 148 MG/DL (ref 74–99)

## 2024-07-17 PROCEDURE — 2500000004 HC RX 250 GENERAL PHARMACY W/ HCPCS (ALT 636 FOR OP/ED): Performed by: INTERNAL MEDICINE

## 2024-07-17 PROCEDURE — 82947 ASSAY GLUCOSE BLOOD QUANT: CPT

## 2024-07-17 PROCEDURE — A45378 PR COLONOSCOPY,DIAGNOSTIC

## 2024-07-17 PROCEDURE — 2500000005 HC RX 250 GENERAL PHARMACY W/O HCPCS

## 2024-07-17 PROCEDURE — 3700000002 HC GENERAL ANESTHESIA TIME - EACH INCREMENTAL 1 MINUTE

## 2024-07-17 PROCEDURE — A45378 PR COLONOSCOPY,DIAGNOSTIC: Performed by: ANESTHESIOLOGY

## 2024-07-17 PROCEDURE — 2500000004 HC RX 250 GENERAL PHARMACY W/ HCPCS (ALT 636 FOR OP/ED)

## 2024-07-17 PROCEDURE — 7100000010 HC PHASE TWO TIME - EACH INCREMENTAL 1 MINUTE

## 2024-07-17 PROCEDURE — 7100000009 HC PHASE TWO TIME - INITIAL BASE CHARGE

## 2024-07-17 PROCEDURE — 3700000001 HC GENERAL ANESTHESIA TIME - INITIAL BASE CHARGE

## 2024-07-17 PROCEDURE — 45378 DIAGNOSTIC COLONOSCOPY: CPT | Performed by: INTERNAL MEDICINE

## 2024-07-17 RX ORDER — PROPOFOL 10 MG/ML
INJECTION, EMULSION INTRAVENOUS AS NEEDED
Status: DISCONTINUED | OUTPATIENT
Start: 2024-07-17 | End: 2024-07-17

## 2024-07-17 RX ORDER — SODIUM CHLORIDE, SODIUM LACTATE, POTASSIUM CHLORIDE, CALCIUM CHLORIDE 600; 310; 30; 20 MG/100ML; MG/100ML; MG/100ML; MG/100ML
20 INJECTION, SOLUTION INTRAVENOUS CONTINUOUS
Status: DISCONTINUED | OUTPATIENT
Start: 2024-07-17 | End: 2024-07-18 | Stop reason: HOSPADM

## 2024-07-17 RX ORDER — LIDOCAINE HCL/PF 100 MG/5ML
SYRINGE (ML) INTRAVENOUS AS NEEDED
Status: DISCONTINUED | OUTPATIENT
Start: 2024-07-17 | End: 2024-07-17

## 2024-07-17 SDOH — HEALTH STABILITY: MENTAL HEALTH: CURRENT SMOKER: 0

## 2024-07-17 ASSESSMENT — PAIN SCALES - GENERAL
PAINLEVEL_OUTOF10: 0 - NO PAIN
PAINLEVEL_OUTOF10: 0 - NO PAIN
PAIN_LEVEL: 0

## 2024-07-17 ASSESSMENT — PAIN - FUNCTIONAL ASSESSMENT
PAIN_FUNCTIONAL_ASSESSMENT: 0-10
PAIN_FUNCTIONAL_ASSESSMENT: 0-10

## 2024-07-17 ASSESSMENT — COLUMBIA-SUICIDE SEVERITY RATING SCALE - C-SSRS
1. IN THE PAST MONTH, HAVE YOU WISHED YOU WERE DEAD OR WISHED YOU COULD GO TO SLEEP AND NOT WAKE UP?: NO
2. HAVE YOU ACTUALLY HAD ANY THOUGHTS OF KILLING YOURSELF?: NO
6. HAVE YOU EVER DONE ANYTHING, STARTED TO DO ANYTHING, OR PREPARED TO DO ANYTHING TO END YOUR LIFE?: NO

## 2024-07-17 NOTE — ANESTHESIA POSTPROCEDURE EVALUATION
Patient: Sanjiv Jhaveri    Procedure Summary       Date: 07/17/24 Room / Location: [facility]    Anesthesia Start: 1330 Anesthesia Stop:     Procedure: COLONOSCOPY Diagnosis:       Encounter for screening for malignant neoplasm of colon      Tubulovillous adenoma of colon    Scheduled Providers: Kylah Mo MD Responsible Provider: Pj Chaves MD    Anesthesia Type: MAC ASA Status: 2            Anesthesia Type: MAC    Vitals Value Taken Time   /87 07/17/24 1405   Temp 36 °C (96.8 °F) 07/17/24 1405   Pulse 84 07/17/24 1405   Resp 15 07/17/24 1405   SpO2 97 % 07/17/24 1405       Anesthesia Post Evaluation    Patient location during evaluation: PACU  Patient participation: complete - patient participated  Level of consciousness: awake and alert  Pain score: 0  Pain management: adequate  Airway patency: patent  Cardiovascular status: acceptable  Respiratory status: acceptable  Hydration status: acceptable  Postoperative Nausea and Vomiting: none    No notable events documented.

## 2024-07-17 NOTE — DISCHARGE INSTRUCTIONS
Patient Instructions after a Colonoscopy      The anesthetics, sedatives or narcotics which were given to you today will be acting in your body for the next 24 hours, so you might feel a little sleepy or groggy.  This feeling should slowly wear off. Carefully read and follow the instructions.     You received sedation today:  - Do not drive or operate any machinery or power tools of any kind.   - No alcoholic beverages today, not even beer or wine.  - Do not make any important decisions or sign any legal documents.  - No over the counter medications that contain alcohol or that may cause drowsiness.  - Do not make any important decisions or sign any legal documents.  - Make sure you have someone with you for first 24 hours.    While it is common to experience mild to moderate abdominal distention, gas, or belching after your procedure, if any of these symptoms occur following discharge from the GI Lab or within one week of having your procedure, call the [facility] to be advised whether a visit to your nearest Urgent Care or Emergency Department is indicated.  Take this paper with you if you go.     - If you develop an allergic reaction to the medications that were given during your procedure such as difficulty breathing, rash, hives, severe nausea, vomiting or lightheadedness.  - If you experience chest pain, shortness of breath, severe abdominal pain, fevers and chills.  -If you develop signs and symptoms of bleeding such as blood in your spit, if your stools turn black, tarry, or bloody  - If you have not urinated within 8 hours following your procedure.  - If your IV site becomes painful, red, inflamed, or looks infected.    If you received a biopsy/polypectomy/sphincterotomy the following instructions apply below:    __ Do not use Aspirin containing products, non-steroidal medications or anti-coagulants for one week following your procedure. (Examples of these types of medications are: Advil,  Arthrotec, Aleve, Coumadin, Ecotrin, Heparin, Ibuprofen, Indocin, Motrin, Naprosyn, Nuprin, Plavix, Vioxx, and Voltarin, or their generic forms.  This list is not all-inclusive.  Check with your physician or pharmacist before resuming medications.)   __ Eat a soft diet today.  Avoid foods that are poorly digested for the next 24 hours.  These foods would include: nuts, beans, lettuce, red meats, and fried foods. Start with liquids and advance your diet as tolerated, gradually work up to eating solids.   __ Do not have a Barium Study or Enema for one week.    Your physician recommends the additional following instructions:    -You have a contact number available for emergencies. The signs and symptoms of potential delayed complications were discussed with you. You may return to normal activities tomorrow.  -Resume your previous diet.  -Continue your present medications.   -We are waiting for your pathology results.  -Your physician has recommended a repeat colonoscopy (date to be determined after pending pathology results are reviewed) for surveillance based on pathology results.  -The findings and recommendations have been discussed with you.  -The findings and recommendations were discussed with your family.  - Please see Medication Reconciliation Form for new medication/medications prescribed.       If you experience any problems or have any questions following discharge from the GI Lab, please call:        Nurse Signature                                                                        Date___________________                                                                            Patient/Responsible Party Signature                                        Date___________________

## 2024-07-17 NOTE — ANESTHESIA PREPROCEDURE EVALUATION
Patient: Sanjiv Jhaveri    Procedure Information       Date/Time: 07/17/24 1320    Scheduled providers: Kylah Mo MD    Procedure: COLONOSCOPY    Location: [facility]            Relevant Problems   Cardiac   (+) Benign essential hypertension   (+) Hypercholesterolemia      Pulmonary   (+) Asthma (HHS-HCC)      GI   (+) GERD (gastroesophageal reflux disease)      /Renal   (+) BPH with obstruction/lower urinary tract symptoms      Endocrine   (+) DM2 (diabetes mellitus, type 2) (Multi)       Clinical information reviewed:   Tobacco  Allergies  Meds   Med Hx  Surg Hx   Fam Hx  Soc Hx        NPO Detail:  NPO/Void Status  Date of Last Liquid: 07/17/24  Time of Last Liquid: 0000  Date of Last Solid: 07/15/24  Time of Last Solid: 1830         Physical Exam    Airway  Mallampati: II  TM distance: >3 FB  Neck ROM: full     Cardiovascular - normal exam     Dental - normal exam     Pulmonary - normal exam     Abdominal - normal exam             Anesthesia Plan    History of general anesthesia?: yes  History of complications of general anesthesia?: no    ASA 2     MAC     The patient is not a current smoker.  Patient was previously instructed to abstain from smoking on day of procedure.  Patient did not smoke on day of procedure.    intravenous induction   Anesthetic plan and risks discussed with patient.  Use of blood products discussed with patient who consented to blood products.    Plan discussed with CRNA.

## 2024-07-17 NOTE — H&P
This 60-year-old gentleman is here for follow-up for tubular adenomas and tubulovillous adenomas.  His last examination was more than 40 years ago he also has uveitis and inflammatory arthritis and positive VEE.  Informed consent was obtained.

## 2024-07-24 ENCOUNTER — APPOINTMENT (OUTPATIENT)
Dept: RHEUMATOLOGY | Facility: CLINIC | Age: 64
End: 2024-07-24
Payer: COMMERCIAL

## 2024-07-24 VITALS
TEMPERATURE: 96.4 F | BODY MASS INDEX: 29.2 KG/M2 | WEIGHT: 204 LBS | HEIGHT: 70 IN | SYSTOLIC BLOOD PRESSURE: 138 MMHG | HEART RATE: 76 BPM | DIASTOLIC BLOOD PRESSURE: 80 MMHG

## 2024-07-24 DIAGNOSIS — Z86.69 H/O IRITIS: Primary | ICD-10-CM

## 2024-07-24 PROCEDURE — 3008F BODY MASS INDEX DOCD: CPT | Performed by: INTERNAL MEDICINE

## 2024-07-24 PROCEDURE — 4010F ACE/ARB THERAPY RXD/TAKEN: CPT | Performed by: INTERNAL MEDICINE

## 2024-07-24 PROCEDURE — 3075F SYST BP GE 130 - 139MM HG: CPT | Performed by: INTERNAL MEDICINE

## 2024-07-24 PROCEDURE — 99213 OFFICE O/P EST LOW 20 MIN: CPT | Performed by: INTERNAL MEDICINE

## 2024-07-24 PROCEDURE — 1036F TOBACCO NON-USER: CPT | Performed by: INTERNAL MEDICINE

## 2024-07-24 PROCEDURE — 3079F DIAST BP 80-89 MM HG: CPT | Performed by: INTERNAL MEDICINE

## 2024-07-24 ASSESSMENT — PAIN SCALES - GENERAL: PAINLEVEL: 0-NO PAIN

## 2024-07-24 NOTE — PROGRESS NOTES
He presents for follow-up evaluation without any musculoskeletal complaints.  He has not had any recurrence of the iritis with taking the Humira 40 mg subcutaneously every 2 weeks.  He recently had a colonoscopy that did not show any polyps or any changes of inflammatory bowel disease.  He is attending physical therapy for left frozen shoulder.  He had surgery on the left hand for Dupuytren's contracture and flexor tendon injury to the fifth digit of the left hand.    The lungs, heart, abdomen, and extremities are benign.  The musculoskeletal examination does not show any active synovitis.  There is a well-healed surgical scar in the palmar aspect of the left hand proximal to the second and third and fifth digits.  There is a small subcutaneous nodule on the extensor aspect of the left elbow.  There is limited abduction of the left shoulder to approximately 70 degrees.  There are no joint effusions in the lower extremities.    Laboratory (7/17/2024) glucose 148, (6/5/2024) CRP <0.4, WBC 5.7, hemoglobin 14.1, hematocrit 42.2, MCV 89.8, MCHC 33.5, platelets 213, BUN 21, creatinine 1.1, glucose 185, calcium 9.4, albumin 3.9, total bilirubin 1.20, SGPT 22, SGOT 19.    He has history of idiopathic iritis, type 2 diabetes mellitus, hypertension, benign prostatic hypertrophy, asthma, history of distal esophageal stricture, history of elevated total bilirubin that has improved off methotrexate.    Apply to switch biosimilar Humira back to the brand-name Humira.  He is to return at the next available office appointment.

## 2024-08-28 ENCOUNTER — TELEPHONE (OUTPATIENT)
Dept: RHEUMATOLOGY | Facility: CLINIC | Age: 64
End: 2024-08-28

## 2024-08-28 NOTE — TELEPHONE ENCOUNTER
Patient left a VM indicating that Dr. Lawrence told him at his visit on 7/17 that he would be taken off of the generic Humira and switched back to the regular Humira. He indicates that he has h

## 2024-09-03 DIAGNOSIS — H20.00: Primary | ICD-10-CM

## 2024-09-03 NOTE — PROGRESS NOTES
Patient wants to go back to brand Humira  Humira has been approved through 08/30/25 and the patient must fill with [facility].

## 2024-09-23 ENCOUNTER — TELEPHONE (OUTPATIENT)
Dept: PRIMARY CARE | Facility: CLINIC | Age: 64
End: 2024-09-23
Payer: COMMERCIAL

## 2024-09-23 DIAGNOSIS — U07.1 COVID-19 VIRUS INFECTION: Primary | ICD-10-CM

## 2024-09-23 NOTE — TELEPHONE ENCOUNTER
Patient called stating he tested positive for covid this morning.  Patient states his first symptom was Friday at midnight a sore throat.  Patient asked if there was anything they do now for covid or would he just have to ride it out.    -  [id number] - [place], [place] - [address]  [address] [facility] [address]  Phone: [phone] Fax: [phone]    Patient [phone]

## 2024-10-09 ENCOUNTER — APPOINTMENT (OUTPATIENT)
Dept: PRIMARY CARE | Facility: CLINIC | Age: 64
End: 2024-10-09
Payer: COMMERCIAL

## 2024-10-22 DIAGNOSIS — E11.9 TYPE 2 DIABETES MELLITUS WITHOUT COMPLICATION, WITHOUT LONG-TERM CURRENT USE OF INSULIN (MULTI): ICD-10-CM

## 2024-10-22 DIAGNOSIS — I10 BENIGN ESSENTIAL HYPERTENSION: ICD-10-CM

## 2024-10-22 RX ORDER — TELMISARTAN 40 MG/1
40 TABLET ORAL DAILY
Qty: 90 TABLET | Refills: 3 | Status: SHIPPED | OUTPATIENT
Start: 2024-10-22

## 2024-10-22 RX ORDER — METFORMIN HYDROCHLORIDE 1000 MG/1
1000 TABLET ORAL
Qty: 180 TABLET | Refills: 3 | Status: SHIPPED | OUTPATIENT
Start: 2024-10-22

## 2024-11-21 DIAGNOSIS — E78.00 HYPERCHOLESTEROLEMIA: ICD-10-CM

## 2024-11-21 RX ORDER — ROSUVASTATIN CALCIUM 10 MG/1
10 TABLET, COATED ORAL DAILY
Qty: 90 TABLET | Refills: 3 | Status: SHIPPED | OUTPATIENT
Start: 2024-11-21

## 2024-12-24 ENCOUNTER — APPOINTMENT (OUTPATIENT)
Dept: PRIMARY CARE | Facility: CLINIC | Age: 64
End: 2024-12-24
Payer: COMMERCIAL

## 2025-01-17 ENCOUNTER — TELEPHONE (OUTPATIENT)
Dept: FAMILY MEDICINE CLINIC | Age: 65
End: 2025-01-17

## 2025-01-17 NOTE — TELEPHONE ENCOUNTER
----- Message from Suresh ZHAO sent at 1/17/2025 10:48 AM EST -----  Regarding: [facility] Appointment Request  [facility] Appointment Request    Patient needs appointment for [facility] Appointment Type: New to Provider.    Patient Requested Dates(s): Any available appointments  Patient Requested Time: Anytime  Provider Name:    Reason for Appointment Request: Established Patient - No appointments available during search  --------------------------------------------------------------------------------------------------------------------------    Relationship to Patient: Self     Call Back Information: OK to leave message on voicemail  Preferred Call Back Number: Phone [phone]

## 2025-01-21 NOTE — TELEPHONE ENCOUNTER
Left message for the patient to call the office back regarding new patient appt.   Mychart not active.

## 2025-01-29 ENCOUNTER — APPOINTMENT (OUTPATIENT)
Dept: RHEUMATOLOGY | Facility: CLINIC | Age: 65
End: 2025-01-29
Payer: COMMERCIAL

## 2025-05-21 ENCOUNTER — OFFICE VISIT (OUTPATIENT)
Dept: FAMILY MEDICINE CLINIC | Age: 65
End: 2025-05-21
Payer: COMMERCIAL

## 2025-05-21 VITALS
HEART RATE: 77 BPM | DIASTOLIC BLOOD PRESSURE: 80 MMHG | SYSTOLIC BLOOD PRESSURE: 128 MMHG | HEIGHT: 70 IN | OXYGEN SATURATION: 97 % | WEIGHT: 210 LBS | BODY MASS INDEX: 30.06 KG/M2

## 2025-05-21 DIAGNOSIS — E78.2 MIXED HYPERLIPIDEMIA: ICD-10-CM

## 2025-05-21 DIAGNOSIS — I10 PRIMARY HYPERTENSION: ICD-10-CM

## 2025-05-21 DIAGNOSIS — H20.9 UVEITIS OF RIGHT EYE: ICD-10-CM

## 2025-05-21 DIAGNOSIS — E11.9 TYPE 2 DIABETES MELLITUS WITHOUT COMPLICATION, WITHOUT LONG-TERM CURRENT USE OF INSULIN (HCC): ICD-10-CM

## 2025-05-21 DIAGNOSIS — M75.02 ADHESIVE CAPSULITIS OF LEFT SHOULDER: Primary | ICD-10-CM

## 2025-05-21 PROCEDURE — 3079F DIAST BP 80-89 MM HG: CPT

## 2025-05-21 PROCEDURE — 3017F COLORECTAL CA SCREEN DOC REV: CPT

## 2025-05-21 PROCEDURE — G8427 DOCREV CUR MEDS BY ELIG CLIN: HCPCS

## 2025-05-21 PROCEDURE — 3074F SYST BP LT 130 MM HG: CPT

## 2025-05-21 PROCEDURE — 99204 OFFICE O/P NEW MOD 45 MIN: CPT

## 2025-05-21 PROCEDURE — 1036F TOBACCO NON-USER: CPT

## 2025-05-21 PROCEDURE — 3046F HEMOGLOBIN A1C LEVEL >9.0%: CPT

## 2025-05-21 PROCEDURE — 2022F DILAT RTA XM EVC RTNOPTHY: CPT

## 2025-05-21 PROCEDURE — G8417 CALC BMI ABV UP PARAM F/U: HCPCS

## 2025-05-21 RX ORDER — ROSUVASTATIN CALCIUM 10 MG/1
10 TABLET, COATED ORAL DAILY
COMMUNITY
Start: 2024-11-21

## 2025-05-21 RX ORDER — RAMIPRIL 5 MG/1
5 CAPSULE ORAL DAILY
COMMUNITY

## 2025-05-21 SDOH — ECONOMIC STABILITY: FOOD INSECURITY: WITHIN THE PAST 12 MONTHS, YOU WORRIED THAT YOUR FOOD WOULD RUN OUT BEFORE YOU GOT MONEY TO BUY MORE.: NEVER TRUE

## 2025-05-21 SDOH — ECONOMIC STABILITY: FOOD INSECURITY: WITHIN THE PAST 12 MONTHS, THE FOOD YOU BOUGHT JUST DIDN'T LAST AND YOU DIDN'T HAVE MONEY TO GET MORE.: NEVER TRUE

## 2025-05-21 ASSESSMENT — PATIENT HEALTH QUESTIONNAIRE - PHQ9
SUM OF ALL RESPONSES TO PHQ QUESTIONS 1-9: 0
SUM OF ALL RESPONSES TO PHQ QUESTIONS 1-9: 0
1. LITTLE INTEREST OR PLEASURE IN DOING THINGS: NOT AT ALL
2. FEELING DOWN, DEPRESSED OR HOPELESS: NOT AT ALL
SUM OF ALL RESPONSES TO PHQ QUESTIONS 1-9: 0
SUM OF ALL RESPONSES TO PHQ QUESTIONS 1-9: 0

## 2025-05-21 NOTE — PROGRESS NOTES
Earl Auguste (:  1960) is a 64 y.o. male,New patient, here for evaluation of the following chief complaint(s):  New Patient (Patient would like to discuss diet plan for DM2)         Assessment & Plan  Adhesive capsulitis of left shoulder    Chronic condition since 2024.  Did complete physical therapy, also has had steroid injections in the past with some improvement of his symptoms for about a 3-week period.  Most recently has been seeing a chiropractor, but insurance stopped covering the soon.  Declines steroid injection at this time.  Continue with home exercise programs.  Patient prefers to delay orthopedic evaluation/possible surgery which I agree with for now.         Uveitis of right eye    Chronic condition, takes adalimumab previously following with rheumatology.  Will refer for new rheumatologist today.  Has not had a flare since time of diagnosis several years ago.    Orders:    Jenna Hodgson MD, Rheumatology, [facility]    Type 2 diabetes mellitus without complication, without long-term current use of insulin ([facility])    Chronic condition, reportedly last hemoglobin A1c 7.2.  On metformin 1000 mg once a day.  Continue the same.  Counseled at length about proper diet and exercise.  Repeat labs next office visit.         Primary hypertension    Chronic condition, controlled continue ramipril 5 mg daily.         Mixed hyperlipidemia    Chronic condition, controlled continue rosuvastatin 10 mg daily.           Return in about 3 months (around 2025).       Subjective     Patient is 64-year-old male who presents today to establish care with me.  Has questions about proper diet and exercise today as well as left shoulder adhesive capsulitis.    Recently moved to the area 2025 to be closer to his son.  Has dealt with issues of adhesive capsulitis since last year.  Long diagnosis of diabetes as well as right eye uveitis. Reviewed labs from 2024 in care

## 2025-05-21 NOTE — PATIENT INSTRUCTIONS
- Continue with all medications as prescribed, when the time comes for refills please let me know  - Continue with your home exercise program, can consider steroid joint injections in the future  - If after 16 to 24 months total of symptoms without great improvement can consider orthopedic referral and surgery at that time    - Referral to rheumatology for your history of uveitis    - Will plan for a 3 to 4-month follow-up for annual physical exam

## 2025-05-21 NOTE — ASSESSMENT & PLAN NOTE
Chronic condition, takes adalimumab previously following with rheumatology.  Will refer for new rheumatologist today.  Has not had a flare since time of diagnosis several years ago.    Orders:    CANDI - Jenna Ponce MD, Rheumatology, [facility]

## 2025-05-21 NOTE — ASSESSMENT & PLAN NOTE
Chronic condition, reportedly last hemoglobin A1c 7.2.  On metformin 1000 mg once a day.  Continue the same.  Counseled at length about proper diet and exercise.  Repeat labs next office visit.

## 2025-05-29 NOTE — PROGRESS NOTES
Imaging Results  - documented in this encounter   Colonoscopy Screening; Average Risk Patient (07/17/2024 2:03 PM EDT)  Imaging Results - Colonoscopy Screening; Average Risk Patient (07/17/2024 2:03 PM EDT)  Anatomical Region Laterality Modality       Endoscopy     Imaging Results - Colonoscopy Screening; Average Risk Patient (07/17/2024 2:03 PM EDT)  Specimen (Source) Anatomical Location / Laterality Collection Method / Volume Collection Time Received Time                 Imaging Results - Colonoscopy Screening; Average Risk Patient (07/17/2024 2:03 PM EDT)  Narrative   07/17/2024 2:07 PM EDT   Table formatting from the original result was not included.  Impression  The ileocecal valve, cecum, ascending colon, hepatic flexure, transverse  colon, splenic flexure, descending colon, sigmoid colon, rectosigmoid and  rectum appeared normal.      Findings  The ileocecal valve, cecum, ascending colon, hepatic flexure, transverse  colon, splenic flexure, descending colon, sigmoid colon, rectosigmoid and  rectum appeared normal.      Recommendation  Repeat colonoscopy in 5 years, due: 7/16/2029  Personal history of adenomas          Indication  Tubulovillous adenoma of colon, Encounter for screening for malignant  neoplasm of colon    Staff  Staff Role  Breana Harding MD Proceduralist      Medications  See Anesthesia Record.    Preprocedure  A history and physical has been performed, and patient medication  allergies have been reviewed. The patient's tolerance of previous  anesthesia has been reviewed. The risks and benefits of the procedure and  the sedation options and risks were discussed with the patient. All  questions were answered and informed consent obtained.    Details of the Procedure  The patient underwent monitored anesthesia care, which was administered by  an anesthesia professional. The patient's blood pressure, ECG, ETCO2,  heart rate, level of consciousness, oxygen and respirations were